# Patient Record
Sex: MALE | Race: BLACK OR AFRICAN AMERICAN | Employment: FULL TIME
[De-identification: names, ages, dates, MRNs, and addresses within clinical notes are randomized per-mention and may not be internally consistent; named-entity substitution may affect disease eponyms.]

---

## 2024-10-21 SDOH — HEALTH STABILITY: PHYSICAL HEALTH: ON AVERAGE, HOW MANY DAYS PER WEEK DO YOU ENGAGE IN MODERATE TO STRENUOUS EXERCISE (LIKE A BRISK WALK)?: 0 DAYS

## 2024-10-21 SDOH — HEALTH STABILITY: PHYSICAL HEALTH: ON AVERAGE, HOW MANY MINUTES DO YOU ENGAGE IN EXERCISE AT THIS LEVEL?: 0 MIN

## 2024-10-23 ENCOUNTER — OFFICE VISIT (OUTPATIENT)
Facility: CLINIC | Age: 29
End: 2024-10-23
Payer: COMMERCIAL

## 2024-10-23 VITALS
OXYGEN SATURATION: 96 % | WEIGHT: 114 LBS | BODY MASS INDEX: 18.32 KG/M2 | TEMPERATURE: 98 F | DIASTOLIC BLOOD PRESSURE: 70 MMHG | SYSTOLIC BLOOD PRESSURE: 126 MMHG | HEART RATE: 84 BPM | RESPIRATION RATE: 18 BRPM | HEIGHT: 66 IN

## 2024-10-23 DIAGNOSIS — Z76.89 ENCOUNTER TO ESTABLISH CARE: Primary | ICD-10-CM

## 2024-10-23 DIAGNOSIS — G89.29 CHRONIC LOW BACK PAIN WITHOUT SCIATICA, UNSPECIFIED BACK PAIN LATERALITY: ICD-10-CM

## 2024-10-23 DIAGNOSIS — Z85.048 HISTORY OF RECTAL CANCER: ICD-10-CM

## 2024-10-23 DIAGNOSIS — R07.89 CHEST WALL PAIN: ICD-10-CM

## 2024-10-23 DIAGNOSIS — D13.91 FAMILIAL POLYPOSIS: ICD-10-CM

## 2024-10-23 DIAGNOSIS — M54.50 CHRONIC LOW BACK PAIN WITHOUT SCIATICA, UNSPECIFIED BACK PAIN LATERALITY: ICD-10-CM

## 2024-10-23 DIAGNOSIS — R94.31 ABNORMAL ECG: ICD-10-CM

## 2024-10-23 PROBLEM — Z86.0100 HISTORY OF COLONIC POLYPS: Status: RESOLVED | Noted: 2023-09-20 | Resolved: 2024-10-23

## 2024-10-23 PROBLEM — Z85.038 HISTORY OF MALIGNANT NEOPLASM OF COLON: Status: RESOLVED | Noted: 2023-09-20 | Resolved: 2024-10-23

## 2024-10-23 PROBLEM — K92.0 HEMATEMESIS WITHOUT NAUSEA: Status: RESOLVED | Noted: 2022-03-25 | Resolved: 2024-10-23

## 2024-10-23 PROBLEM — C18.9 MALIGNANT NEOPLASM OF COLON (HCC): Status: RESOLVED | Noted: 2022-03-25 | Resolved: 2024-10-23

## 2024-10-23 PROBLEM — C18.2 CANCER OF RIGHT COLON (HCC): Status: RESOLVED | Noted: 2019-07-02 | Resolved: 2024-10-23

## 2024-10-23 PROBLEM — R63.4 WEIGHT LOSS: Status: ACTIVE | Noted: 2022-03-25

## 2024-10-23 PROBLEM — K62.5 GASTROINTESTINAL HEMORRHAGE ASSOCIATED WITH ANORECTAL SOURCE: Status: RESOLVED | Noted: 2019-05-15 | Resolved: 2024-10-23

## 2024-10-23 PROBLEM — K63.5 POLYPOSIS OF COLON: Status: RESOLVED | Noted: 2019-05-15 | Resolved: 2024-10-23

## 2024-10-23 PROBLEM — K62.89 RECTAL MASS: Status: RESOLVED | Noted: 2019-05-10 | Resolved: 2024-10-23

## 2024-10-23 PROBLEM — D64.9 ANEMIA: Status: ACTIVE | Noted: 2019-05-15

## 2024-10-23 PROCEDURE — 99204 OFFICE O/P NEW MOD 45 MIN: CPT | Performed by: FAMILY MEDICINE

## 2024-10-23 RX ORDER — NAPROXEN 500 MG/1
500 TABLET ORAL 2 TIMES DAILY WITH MEALS
Qty: 60 TABLET | Refills: 0 | Status: SHIPPED | OUTPATIENT
Start: 2024-10-23

## 2024-10-23 RX ORDER — CETIRIZINE HYDROCHLORIDE 10 MG/1
10 TABLET ORAL PRN
COMMUNITY

## 2024-10-23 RX ORDER — METHOCARBAMOL 750 MG/1
TABLET, FILM COATED ORAL
COMMUNITY
Start: 2024-10-15 | End: 2024-10-23 | Stop reason: SDUPTHER

## 2024-10-23 RX ORDER — NAPROXEN 500 MG/1
TABLET ORAL
COMMUNITY
Start: 2024-10-15 | End: 2024-10-23 | Stop reason: SDUPTHER

## 2024-10-23 RX ORDER — METHOCARBAMOL 750 MG/1
750 TABLET, FILM COATED ORAL 3 TIMES DAILY
Qty: 90 TABLET | Refills: 0 | Status: SHIPPED | OUTPATIENT
Start: 2024-10-23

## 2024-10-23 SDOH — ECONOMIC STABILITY: INCOME INSECURITY: HOW HARD IS IT FOR YOU TO PAY FOR THE VERY BASICS LIKE FOOD, HOUSING, MEDICAL CARE, AND HEATING?: NOT HARD AT ALL

## 2024-10-23 SDOH — ECONOMIC STABILITY: FOOD INSECURITY: WITHIN THE PAST 12 MONTHS, YOU WORRIED THAT YOUR FOOD WOULD RUN OUT BEFORE YOU GOT MONEY TO BUY MORE.: NEVER TRUE

## 2024-10-23 SDOH — ECONOMIC STABILITY: FOOD INSECURITY: WITHIN THE PAST 12 MONTHS, THE FOOD YOU BOUGHT JUST DIDN'T LAST AND YOU DIDN'T HAVE MONEY TO GET MORE.: NEVER TRUE

## 2024-10-23 ASSESSMENT — PATIENT HEALTH QUESTIONNAIRE - PHQ9
SUM OF ALL RESPONSES TO PHQ QUESTIONS 1-9: 0
1. LITTLE INTEREST OR PLEASURE IN DOING THINGS: NOT AT ALL
2. FEELING DOWN, DEPRESSED OR HOPELESS: NOT AT ALL
SUM OF ALL RESPONSES TO PHQ QUESTIONS 1-9: 0
SUM OF ALL RESPONSES TO PHQ QUESTIONS 1-9: 0
SUM OF ALL RESPONSES TO PHQ9 QUESTIONS 1 & 2: 0
SUM OF ALL RESPONSES TO PHQ QUESTIONS 1-9: 0

## 2024-10-23 ASSESSMENT — ENCOUNTER SYMPTOMS
COUGH: 0
DIARRHEA: 0
EYE REDNESS: 0
SHORTNESS OF BREATH: 0
ABDOMINAL PAIN: 0
BACK PAIN: 1
CHEST TIGHTNESS: 0
WHEEZING: 0

## 2024-10-23 NOTE — PROGRESS NOTES
[facility]  Establish care visit   10/23/2024    Willie Melo (: 1995) is a 29 y.o. male, here to establish care.    Chief Complaint   Patient presents with    Establish Care    Asthma    Back Pain     8 years, needs referral to Ortho    Chest Pain     For weeks, needs referral to Cardiology         ASSESSMENT/ PLAN  1. Encounter to establish care  VS reviewed     BMI reviewed   Appropriate healthy lifestyle modifications discussed.  All questions answered.   F/u discussed.    2. Chronic low back pain without sciatica, unspecified back pain laterality  Uncontrolled chronic issue. Refilled meds for comfort. Needs new referral to ortho back ans pine from ED. Has failed HEP in the past but still continues to use the stretches.   - methocarbamol (ROBAXIN) 750 MG tablet; Take 1 tablet by mouth 3 times daily  Dispense: 90 tablet; Refill: 0  - naproxen (NAPROSYN) 500 MG tablet; Take 1 tablet by mouth 2 times daily (with meals)  Dispense: 60 tablet; Refill: 0  - [facility], [place] ([facility])    3. Chest wall pain  Intermittent, likely musculoskeletal and sometimes referred from back pain. Reproducible. Lasting for a few seconds.     4. Abnormal ECG  Questionable LVH, sinus ken. Given history of cancer, will order echo to rule out structural abnormality and then determine if cardiology referral is needed.   - Echo (TTE) complete (PRN contrast/bubble/strain/3D); Future    5. Familial polyposis  Reviewed chart and work up as well as pathology reports. Followed by [facility].     6. History of rectal cancer  Reviewed chart and work up as well as pathology reports. Followed by [facility].        I have spent 45 minutes on chart review, care coordination and patient counseling regarding disease state, lifestyle modifications and/or health maintenance screening.       Return in about 1 year (around 10/23/2025).    Future Appointments   Date Time 
09-Jun-2023

## 2024-11-01 ENCOUNTER — HOSPITAL ENCOUNTER (OUTPATIENT)
Facility: HOSPITAL | Age: 29
Discharge: HOME | End: 2024-11-03
Payer: COMMERCIAL

## 2024-11-01 VITALS
HEIGHT: 65 IN | HEART RATE: 59 BPM | SYSTOLIC BLOOD PRESSURE: 129 MMHG | BODY MASS INDEX: 18.99 KG/M2 | WEIGHT: 114 LBS | DIASTOLIC BLOOD PRESSURE: 83 MMHG

## 2024-11-01 DIAGNOSIS — R94.31 ABNORMAL ECG: ICD-10-CM

## 2024-11-01 LAB
ECHO AO ASC DIAM: 2.6 CM
ECHO AO ASCENDING AORTA INDEX: 1.67 CM/M2
ECHO AO ROOT DIAM: 3.2 CM
ECHO AO ROOT INDEX: 2.05 CM/M2
ECHO AV AREA PEAK VELOCITY: 3.1 CM2
ECHO AV AREA VTI: 3.1 CM2
ECHO AV AREA/BSA PEAK VELOCITY: 2 CM2/M2
ECHO AV AREA/BSA VTI: 2 CM2/M2
ECHO AV MEAN GRADIENT: 2 MMHG
ECHO AV MEAN VELOCITY: 0.7 M/S
ECHO AV PEAK GRADIENT: 4 MMHG
ECHO AV PEAK VELOCITY: 1 M/S
ECHO AV VELOCITY RATIO: 0.9
ECHO AV VTI: 21.1 CM
ECHO BSA: 1.54 M2
ECHO LA DIAMETER INDEX: 1.92 CM/M2
ECHO LA DIAMETER: 3 CM
ECHO LA TO AORTIC ROOT RATIO: 0.94
ECHO LA VOL A-L A2C: 42 ML (ref 18–58)
ECHO LA VOL A-L A4C: 38 ML (ref 18–58)
ECHO LA VOL BP: 40 ML (ref 18–58)
ECHO LA VOL MOD A2C: 42 ML (ref 18–58)
ECHO LA VOL MOD A4C: 34 ML (ref 18–58)
ECHO LA VOL/BSA BIPLANE: 26 ML/M2 (ref 16–34)
ECHO LA VOLUME AREA LENGTH: 42 ML
ECHO LA VOLUME INDEX A-L A2C: 27 ML/M2 (ref 16–34)
ECHO LA VOLUME INDEX A-L A4C: 24 ML/M2 (ref 16–34)
ECHO LA VOLUME INDEX AREA LENGTH: 27 ML/M2 (ref 16–34)
ECHO LA VOLUME INDEX MOD A2C: 27 ML/M2 (ref 16–34)
ECHO LA VOLUME INDEX MOD A4C: 22 ML/M2 (ref 16–34)
ECHO LV E' LATERAL VELOCITY: 16.4 CM/S
ECHO LV E' SEPTAL VELOCITY: 13.4 CM/S
ECHO LV EDV A2C: 82 ML
ECHO LV EDV A4C: 107 ML
ECHO LV EDV BP: 96 ML (ref 67–155)
ECHO LV EDV INDEX A4C: 69 ML/M2
ECHO LV EDV INDEX BP: 62 ML/M2
ECHO LV EDV NDEX A2C: 53 ML/M2
ECHO LV EJECTION FRACTION A2C: 51 %
ECHO LV EJECTION FRACTION A4C: 56 %
ECHO LV EJECTION FRACTION BIPLANE: 55 % (ref 55–100)
ECHO LV ESV A2C: 40 ML
ECHO LV ESV A4C: 47 ML
ECHO LV ESV BP: 43 ML (ref 22–58)
ECHO LV ESV INDEX A2C: 26 ML/M2
ECHO LV ESV INDEX A4C: 30 ML/M2
ECHO LV ESV INDEX BP: 28 ML/M2
ECHO LV FRACTIONAL SHORTENING: 28 % (ref 28–44)
ECHO LV INTERNAL DIMENSION DIASTOLE INDEX: 2.5 CM/M2
ECHO LV INTERNAL DIMENSION DIASTOLIC: 3.9 CM (ref 4.2–5.9)
ECHO LV INTERNAL DIMENSION SYSTOLIC INDEX: 1.79 CM/M2
ECHO LV INTERNAL DIMENSION SYSTOLIC: 2.8 CM
ECHO LV IVSD: 0.8 CM (ref 0.6–1)
ECHO LV MASS 2D: 97.4 G (ref 88–224)
ECHO LV MASS INDEX 2D: 62.4 G/M2 (ref 49–115)
ECHO LV POSTERIOR WALL DIASTOLIC: 0.9 CM (ref 0.6–1)
ECHO LV RELATIVE WALL THICKNESS RATIO: 0.46
ECHO LVOT AREA: 3.5 CM2
ECHO LVOT AV VTI INDEX: 0.86
ECHO LVOT DIAM: 2.1 CM
ECHO LVOT MEAN GRADIENT: 2 MMHG
ECHO LVOT PEAK GRADIENT: 3 MMHG
ECHO LVOT PEAK VELOCITY: 0.9 M/S
ECHO LVOT STROKE VOLUME INDEX: 40.4 ML/M2
ECHO LVOT SV: 63 ML
ECHO LVOT VTI: 18.2 CM
ECHO MV A VELOCITY: 0.44 M/S
ECHO MV AREA VTI: 3.5 CM2
ECHO MV E DECELERATION TIME (DT): 137.5 MS
ECHO MV E VELOCITY: 0.67 M/S
ECHO MV E/A RATIO: 1.52
ECHO MV E/E' LATERAL: 4.09
ECHO MV E/E' RATIO (AVERAGED): 4.54
ECHO MV E/E' SEPTAL: 5
ECHO MV LVOT VTI INDEX: 0.99
ECHO MV MAX VELOCITY: 0.7 M/S
ECHO MV MEAN GRADIENT: 1 MMHG
ECHO MV MEAN VELOCITY: 0.4 M/S
ECHO MV PEAK GRADIENT: 2 MMHG
ECHO MV VTI: 18 CM
ECHO PV MAX VELOCITY: 0.8 M/S
ECHO PV MEAN GRADIENT: 2 MMHG
ECHO PV MEAN VELOCITY: 0.6 M/S
ECHO PV PEAK GRADIENT: 3 MMHG
ECHO RA END SYSTOLIC VOLUME APICAL 4 CHAMBER INDEX BSA: 26 ML/M2
ECHO RA VOLUME: 40 ML
ECHO RV FREE WALL PEAK S': 14.9 CM/S
ECHO RV INTERNAL DIMENSION: 4 CM
ECHO RV TAPSE: 2.3 CM (ref 1.7–?)
ECHO RVOT MEAN GRADIENT: 1 MMHG
ECHO RVOT PEAK GRADIENT: 2 MMHG
ECHO RVOT PEAK VELOCITY: 0.6 M/S
ECHO RVOT VTI: 16.5 CM

## 2024-11-01 PROCEDURE — 93306 TTE W/DOPPLER COMPLETE: CPT

## 2024-11-15 ENCOUNTER — OFFICE VISIT (OUTPATIENT)
Facility: CLINIC | Age: 29
End: 2024-11-15
Payer: COMMERCIAL

## 2024-11-15 VITALS
SYSTOLIC BLOOD PRESSURE: 120 MMHG | OXYGEN SATURATION: 95 % | RESPIRATION RATE: 20 BRPM | WEIGHT: 117 LBS | BODY MASS INDEX: 19.49 KG/M2 | TEMPERATURE: 98.2 F | HEIGHT: 65 IN | HEART RATE: 78 BPM | DIASTOLIC BLOOD PRESSURE: 68 MMHG

## 2024-11-15 DIAGNOSIS — R19.7 DIARRHEA, UNSPECIFIED TYPE: ICD-10-CM

## 2024-11-15 DIAGNOSIS — J18.9 COMMUNITY ACQUIRED PNEUMONIA OF RIGHT UPPER LOBE OF LUNG: Primary | ICD-10-CM

## 2024-11-15 DIAGNOSIS — R05.1 ACUTE COUGH: ICD-10-CM

## 2024-11-15 PROCEDURE — 99214 OFFICE O/P EST MOD 30 MIN: CPT | Performed by: FAMILY MEDICINE

## 2024-11-15 RX ORDER — DOXYCYCLINE 100 MG/1
100 CAPSULE ORAL 2 TIMES DAILY
COMMUNITY
Start: 2024-11-14 | End: 2024-11-21

## 2024-11-15 ASSESSMENT — ENCOUNTER SYMPTOMS
SHORTNESS OF BREATH: 0
ABDOMINAL PAIN: 0
CHEST TIGHTNESS: 0
DIARRHEA: 0
WHEEZING: 0
EYE REDNESS: 0
COUGH: 1

## 2024-11-15 NOTE — PROGRESS NOTES
\"Have you been to the ER, urgent care clinic since your last visit?  Hospitalized since your last visit?\"    YES - When: approximately 1 days ago.  Where and Why: [facility] ED for Pneumonia .    “Have you seen or consulted any other health care providers outside our system since your last visit?”    NO           
atraumatic.      Right Ear: External ear normal.      Left Ear: External ear normal.      Nose: Rhinorrhea present. No congestion.      Mouth/Throat:      Mouth: Mucous membranes are moist.      Pharynx: Oropharynx is clear. No oropharyngeal exudate or posterior oropharyngeal erythema.   Eyes:      Conjunctiva/sclera: Conjunctivae normal.   Cardiovascular:      Rate and Rhythm: Normal rate and regular rhythm.      Pulses: Normal pulses.      Heart sounds: Normal heart sounds.   Pulmonary:      Effort: Pulmonary effort is normal. No respiratory distress.      Breath sounds: Normal breath sounds. No wheezing, rhonchi or rales.   Chest:      Chest wall: Tenderness present.   Abdominal:      General: Bowel sounds are normal.   Musculoskeletal:         General: No signs of injury. Normal range of motion.      Cervical back: Normal range of motion.   Skin:     General: Skin is warm.      Capillary Refill: Capillary refill takes less than 2 seconds.      Findings: No erythema.   Neurological:      General: No focal deficit present.      Mental Status: He is alert and oriented to person, place, and time. Mental status is at baseline.   Psychiatric:         Mood and Affect: Mood normal.         Behavior: Behavior normal.         Thought Content: Thought content normal.         Judgment: Judgment normal.         Immunization History   Administered Date(s) Administered    Adenovirus, Type 4 And 7, Live, Oral (Admin As 2 Tab) 01/27/2015    Anthrax (BioThrax) 04/21/2015    Hep A-Hep B, TWINRIX, (age 18y+), IM, 1mL 01/27/2015, 04/21/2015    Influenza, FLUMIST, (age 2-49 y), Quadv Live, INTRANASAL, 0.2m 01/27/2015    Meningococcal ACWY, MENACTRA (MenACWY-D), (age 9m-55y), IM, 0.5mL 01/27/2015    Poliovirus, IPOL, (age 6w+), SC/IM, 0.5mL 01/27/2015    TDaP, ADACEL (age 10y-64y), BOOSTRIX (age 10y+), IM, 0.5mL 01/27/2015    Typhoid Vi capsular polysaccharide (Typhim VI) 04/21/2015    Varicella, VARIVAX, (age 12m+), SC, 0.5mL

## 2024-12-02 SDOH — HEALTH STABILITY: PHYSICAL HEALTH: ON AVERAGE, HOW MANY MINUTES DO YOU ENGAGE IN EXERCISE AT THIS LEVEL?: 30 MIN

## 2024-12-02 SDOH — HEALTH STABILITY: PHYSICAL HEALTH: ON AVERAGE, HOW MANY DAYS PER WEEK DO YOU ENGAGE IN MODERATE TO STRENUOUS EXERCISE (LIKE A BRISK WALK)?: 3 DAYS

## 2024-12-03 ENCOUNTER — OFFICE VISIT (OUTPATIENT)
Age: 29
End: 2024-12-03
Payer: COMMERCIAL

## 2024-12-03 ENCOUNTER — TELEPHONE (OUTPATIENT)
Facility: CLINIC | Age: 29
End: 2024-12-03

## 2024-12-03 VITALS
DIASTOLIC BLOOD PRESSURE: 76 MMHG | SYSTOLIC BLOOD PRESSURE: 113 MMHG | HEART RATE: 85 BPM | TEMPERATURE: 97.8 F | WEIGHT: 116 LBS | HEIGHT: 65 IN | OXYGEN SATURATION: 96 % | BODY MASS INDEX: 19.33 KG/M2

## 2024-12-03 DIAGNOSIS — G89.29 CHRONIC LOW BACK PAIN WITHOUT SCIATICA, UNSPECIFIED BACK PAIN LATERALITY: ICD-10-CM

## 2024-12-03 DIAGNOSIS — M54.50 CHRONIC LOW BACK PAIN WITHOUT SCIATICA, UNSPECIFIED BACK PAIN LATERALITY: ICD-10-CM

## 2024-12-03 DIAGNOSIS — M79.18 CHRONIC PRIMARY MUSCULOSKELETAL PAIN: Primary | ICD-10-CM

## 2024-12-03 DIAGNOSIS — G89.29 CHRONIC PRIMARY MUSCULOSKELETAL PAIN: Primary | ICD-10-CM

## 2024-12-03 DIAGNOSIS — M54.6 PAIN IN THORACIC SPINE: ICD-10-CM

## 2024-12-03 DIAGNOSIS — M54.2 NECK PAIN: ICD-10-CM

## 2024-12-03 PROCEDURE — 72070 X-RAY EXAM THORAC SPINE 2VWS: CPT | Performed by: PHYSICAL MEDICINE & REHABILITATION

## 2024-12-03 PROCEDURE — 72040 X-RAY EXAM NECK SPINE 2-3 VW: CPT | Performed by: PHYSICAL MEDICINE & REHABILITATION

## 2024-12-03 PROCEDURE — 99204 OFFICE O/P NEW MOD 45 MIN: CPT | Performed by: PHYSICAL MEDICINE & REHABILITATION

## 2024-12-03 RX ORDER — NAPROXEN 500 MG/1
500 TABLET ORAL 2 TIMES DAILY PRN
Qty: 180 TABLET | Refills: 1 | Status: SHIPPED | OUTPATIENT
Start: 2024-12-03 | End: 2025-06-01

## 2024-12-03 ASSESSMENT — ENCOUNTER SYMPTOMS
SHORTNESS OF BREATH: 0
TROUBLE SWALLOWING: 0
BACK PAIN: 1
DIARRHEA: 0
VOMITING: 0
NAUSEA: 0

## 2024-12-03 NOTE — TELEPHONE ENCOUNTER
Patient is still experiencing a lot of cough and congestion. Is unsure if he should make another appt to come in.

## 2024-12-03 NOTE — PROGRESS NOTES
Willie Melo presents today for   Chief Complaint   Patient presents with    Neck Pain    Back Pain     Upper and mid         Is someone accompanying this pt? no    Is the patient using any DME equipment during OV? no    Coordination of Care:  1. Have you been to the ER, urgent care clinic since your last visit? Yes, pt went to ER in November for pneumonia  Hospitalized since your last visit? no    2. Have you seen or consulted any other health care providers outside of the [facility] since your last visit? Yes, pcp Include any pap smears or colon screening. no      
symptoms and treatment plan.  All questions were answered. More than half of this visit today was spent on counseling.     By signing my name below, I, KIERRA Fernandez, attest that this documentation has been prepared under the direction and in the presence of Morris Dutton MD  Electronically signed: KIERRA Fernandez. 12/3/24 10:03 AM EST      I, Morris Dutton MD, personally performed the services described in this documentation. I have authorized the scribe to complete the medical record entries input within this chart. I have reviewed the chart and agree that the record reflects my personal performance and is accurate and complete. [Electronically Signed: Morris Dutton MD. 12/3/2024 1:38 PM]

## 2024-12-04 DIAGNOSIS — M54.50 CHRONIC LOW BACK PAIN WITHOUT SCIATICA, UNSPECIFIED BACK PAIN LATERALITY: ICD-10-CM

## 2024-12-04 DIAGNOSIS — G89.29 CHRONIC LOW BACK PAIN WITHOUT SCIATICA, UNSPECIFIED BACK PAIN LATERALITY: ICD-10-CM

## 2024-12-04 RX ORDER — METHOCARBAMOL 750 MG/1
750 TABLET, FILM COATED ORAL 3 TIMES DAILY
Qty: 90 TABLET | Refills: 0 | OUTPATIENT
Start: 2024-12-04

## 2024-12-04 RX ORDER — METHOCARBAMOL 750 MG/1
750 TABLET, FILM COATED ORAL 3 TIMES DAILY
Qty: 90 TABLET | Refills: 0 | Status: SHIPPED | OUTPATIENT
Start: 2024-12-04

## 2024-12-04 NOTE — TELEPHONE ENCOUNTER
This patient contacted the office for the following prescriptions to be refilled:    Medication requested :   Requested Prescriptions     Pending Prescriptions Disp Refills    methocarbamol (ROBAXIN) 750 MG tablet [Pharmacy Med Name: METHOCARBAMOL 750 MG TABLET] 90 tablet 0     Sig: TAKE 1 TABLET BY MOUTH 3 TIMES A DAY        Last Refilled: 10/23/2024    Last Office Visit: 11/15/2024    Next Office Visit: 10/23/2025      
09-Apr-2019

## 2024-12-27 ENCOUNTER — HOSPITAL ENCOUNTER (OUTPATIENT)
Facility: HOSPITAL | Age: 29
Setting detail: RECURRING SERIES
Discharge: HOME | End: 2024-12-30
Payer: COMMERCIAL

## 2024-12-27 PROCEDURE — 97161 PT EVAL LOW COMPLEX 20 MIN: CPT

## 2024-12-27 NOTE — PROGRESS NOTES
PT DAILY TREATMENT NOTE/CERVICAL [id number]      Patient Name: Willie Melo    Date: 2024    : 1995  Insurance: Payor: [facility] / Plan: [facility] / Product Type: *No Product type* /      Patient  verified yes     Visit #   Current / Total 1 12   Time   In / Out 10:40 11:18   Pain   In / Out 4-5/10 4-5/10   Subjective Functional Status/Changes: See below     Treatment Area: No admission diagnoses are documented for this encounter.    If an interpreting service is utilized for treatment of this patient, the contents of this document represent the material reviewed with the patient via the .     SUBJECTIVE  Pain Level (0-10 scale): 4-5/10  []constant []intermittent []improving []worsening []no change since onset    Any medication changes, allergies to medications, adverse drug reactions, diagnosis change, or new procedure performed?: [x] No    [] Yes (see summary sheet for update)  Subjective functional status/changes:       Mechanism of Injury:  . Pt. Reports he was in [employer] doing [employer] work around that time. Reports pain has worsened over time. Reports pain with turning his head. Sleeping is difficulty because of pain. Headaches occasionally at back of head 2-3x a week. Increased pain with laying still and turning. Denies numbness/tingling or weakness into hands. Denies dizziness. [facility]   Work Hx: driving for work to [employer]   Pt Goals: to have less pain     OBJECTIVE/EXAMINATION      33 min [x]Eval - untimed                      Therapeutic Procedures:  Tx Min Billable or 1:1 Min (if diff from Tx Min) Procedure, Rationale, Specifics   5  10957 Therapeutic Exercise (timed):  increase ROM, strength, coordination, balance, and proprioception to improve patient's ability to progress to PLOF and address remaining functional goals. (see flow sheet as applicable)     Details if applicable:  HEP          Details if applicable:

## 2024-12-27 NOTE — THERAPY EVALUATION
[facility]  [address] [place] [address] Ph:[phone] Fx: [phone]  Plan of Care / Statement of Necessity for Physical Therapy Services     Patient Name: Willie Melo : 1995   Medical   Diagnosis: M54.2  NECK PAIN and M54.6  THORACIC PAIN Treatment Diagnosis: M54.2  NECK PAIN and M54.6  THORACIC PAIN      Onset Date:  Payor :  Payor: [facility] / Plan: [facility] / Product Type: *No Product type* /    Referral Source: Morris Dutton MD Start of Care (SOC): 2024   Prior Hospitalization: See medical history Provider #: [id number]   Prior Level of Function: Ind with ADLs, driving for work   Comorbidities:  Respiratory disorders and Musculoskeletal disorders     Assessment / key information:    Pt. Is a 29 year old male c/o neck and back pain that began in  and has worsened over time. He reports having increased pain with turning his head. He also reports having difficulty getting to sleep. He does reports having headaches 2-3x a week and denies numbness/tingling into UE. Pt. Presents with decreased cervical AROM in all directions rotation right: 31 degrees left: 23 degrees. He also has decreased shoulder abduction AROM right: 110 degrees left: 103 degrees. No myotome invovlement was noted but has has decreased B horizontal abduction strength at 4-/5. Deep cervical flexion endurance was 7 seconds. He has cervical and thoracic hypomobility. He also has significant muscle tightness and tenderness with palpation over thoracic and cervical paraspinals. Skilled PT is medically necessary in order to improve cervical and thoracic mobility and strength for increased ease of ADLs and improved quality of life.     Evaluation Complexity:  History:  MEDIUM  Complexity : 1-2 comorbidities / personal factors will impact the outcome/ POC ; Examination:  MEDIUM Complexity : 3 Standardized tests and measures

## 2025-01-15 ENCOUNTER — HOSPITAL ENCOUNTER (OUTPATIENT)
Facility: HOSPITAL | Age: 30
Setting detail: RECURRING SERIES
Discharge: HOME | End: 2025-01-18
Payer: COMMERCIAL

## 2025-01-15 PROCEDURE — 97112 NEUROMUSCULAR REEDUCATION: CPT

## 2025-01-15 PROCEDURE — 97535 SELF CARE MNGMENT TRAINING: CPT

## 2025-01-15 PROCEDURE — 97110 THERAPEUTIC EXERCISES: CPT

## 2025-01-15 PROCEDURE — 97530 THERAPEUTIC ACTIVITIES: CPT

## 2025-01-15 NOTE — PROGRESS NOTES
PHYSICAL / OCCUPATIONAL THERAPY - DAILY TREATMENT NOTE    Patient Name: Willie Melo    Date: 1/15/2025    : 1995  Insurance: Payor: [facility] [facility] / Plan: [employer] [facility] / Product Type: *No Product type* /      Patient  verified Yes     Visit #   Current / Total 2 12   Time   In / Out 4:00 4:48   Pain   In / Out 0/10 3/10   Subjective Functional Status/Changes: Pt reports he took his meds like an hour ago. He is doing HEP 4x weekly. Things are about the same.      TREATMENT AREA =  Neck pain [M54.2]  Thoracic back pain [M54.6]     OBJECTIVE    Modalities Rationale:     decrease pain and increase tissue extensibility to improve patient's ability to progress to PLOF and address remaining functional goals.     min [] Estim Unattended, type/location:                                      []  w/ice    []  w/heat    min [] Estim Attended, type/location:                                     []  w/US     []  w/ice    []  w/heat    []  TENS insruct      min []  Mechanical Traction: type/lbs                   []  pro   []  sup   []  int   []  cont    []  before manual    []  after manual    min []  Ultrasound, settings/location:     10 min  unbill []  Ice     [x]  Heat    location/position: Supine with wedge - c/s and UTs    min []  Paraffin,  details:     min []  Vasopneumatic Device, press/temp:     min []  Whirlpool / Fluido:    If using vaso (only need to measure limb vaso being performed on)      pre-treatment girth :       post-treatment girth :       measured at (landmark location) :      min []  Other:    Skin assessment post-treatment:   Intact      Therapeutic Procedures:    Tx Min Billable or 1:1 Min (if diff from Tx Min) Procedure, Rationale, Specifics   12  [id number] Therapeutic Exercise (timed):  increase ROM, strength, coordination, balance, and proprioception to improve patient's ability to progress to PLOF and address remaining functional goals. (see flow sheet as applicable)

## 2025-01-17 ENCOUNTER — HOSPITAL ENCOUNTER (OUTPATIENT)
Facility: HOSPITAL | Age: 30
Setting detail: RECURRING SERIES
Discharge: HOME | End: 2025-01-20
Payer: COMMERCIAL

## 2025-01-17 PROCEDURE — 97530 THERAPEUTIC ACTIVITIES: CPT

## 2025-01-17 PROCEDURE — 97112 NEUROMUSCULAR REEDUCATION: CPT

## 2025-01-17 PROCEDURE — 97110 THERAPEUTIC EXERCISES: CPT

## 2025-01-17 NOTE — PROGRESS NOTES
PHYSICAL / OCCUPATIONAL THERAPY - DAILY TREATMENT NOTE    Patient Name: Willie Melo    Date: 2025    : 1995  Insurance: Payor: [facility] / Plan: [facility] [facility] / Product Type: *No Product type* /      Patient  verified Yes     Visit #   Current / Total 3 12   Time   In / Out 10:00 10:55   Pain   In / Out 3/10 suboccipitals  and right c/s 1/10   Subjective Functional Status/Changes: Pt reports pain at base of hema and into right side of neck. Last night his back hurt and he did not sleep well.     He felt some relief after last session for 4-5 hours then the pain came back.      TREATMENT AREA =  Neck pain [M54.2]  Thoracic back pain [M54.6]     OBJECTIVE    Modalities Rationale:     decrease pain and increase tissue extensibility to improve patient's ability to progress to PLOF and address remaining functional goals.     min [] Estim Unattended, type/location:                                      []  w/ice    []  w/heat    min [] Estim Attended, type/location:                                     []  w/US     []  w/ice    []  w/heat    []  TENS insruct      min []  Mechanical Traction: type/lbs                   []  pro   []  sup   []  int   []  cont    []  before manual    []  after manual    min []  Ultrasound, settings/location:     10 min  unbill []  Ice     [x]  Heat    location/position: Supine with wedge - c/s and UTs    min []  Paraffin,  details:     min []  Vasopneumatic Device, press/temp:     min []  Whirlpool / Fluido:    If using vaso (only need to measure limb vaso being performed on)      pre-treatment girth :       post-treatment girth :       measured at (landmark location) :      min []  Other:    Skin assessment post-treatment:   Intact      Therapeutic Procedures:    Tx Min Billable or 1:1 Min (if diff from Tx Min) Procedure, Rationale, Specifics   15 [phone] [id number] Therapeutic Exercise (timed):  increase ROM, strength, coordination, balance, and

## 2025-01-20 ENCOUNTER — HOSPITAL ENCOUNTER (OUTPATIENT)
Facility: HOSPITAL | Age: 30
Setting detail: RECURRING SERIES
Discharge: HOME | End: 2025-01-23
Payer: COMMERCIAL

## 2025-01-20 PROCEDURE — 97112 NEUROMUSCULAR REEDUCATION: CPT

## 2025-01-20 PROCEDURE — 97530 THERAPEUTIC ACTIVITIES: CPT

## 2025-01-20 PROCEDURE — 97110 THERAPEUTIC EXERCISES: CPT

## 2025-01-20 NOTE — PROGRESS NOTES
PHYSICAL / OCCUPATIONAL THERAPY - DAILY TREATMENT NOTE    Patient Name: Willie Melo    Date: 2025    : 1995  Insurance: Payor: [facility] / Plan: [facility] [facility] / Product Type: *No Product type* /      Patient  verified Yes     Visit #   Current / Total 4 12   Time   In / Out 9:15 10:08   Pain   In / Out 10 upper t/s 10   Subjective Functional Status/Changes: Pt reports his neck felt better after last session, he had some pain in his back but it went away after a while. His pain today is upper back. He took his pain medicine this morning but does not think it has kicked all the way in yet.      TREATMENT AREA =  Neck pain [M54.2]  Thoracic back pain [M54.6]     OBJECTIVE    Modalities Rationale:     decrease pain and increase tissue extensibility to improve patient's ability to progress to PLOF and address remaining functional goals.     min [] Estim Unattended, type/location:                                      []  w/ice    []  w/heat    min [] Estim Attended, type/location:                                     []  w/US     []  w/ice    []  w/heat    []  TENS insruct      min []  Mechanical Traction: type/lbs                   []  pro   []  sup   []  int   []  cont    []  before manual    []  after manual    min []  Ultrasound, settings/location:     10 min  unbill []  Ice     [x]  Heat    location/position: Supine with wedge - t/s    min []  Paraffin,  details:     min []  Vasopneumatic Device, press/temp:     min []  Whirlpool / Fluido:    If using vaso (only need to measure limb vaso being performed on)      pre-treatment girth :       post-treatment girth :       measured at (landmark location) :      min []  Other:    Skin assessment post-treatment:   Intact      Therapeutic Procedures:    Tx Min Billable or 1:1 Min (if diff from Tx Min) Procedure, Rationale, Specifics   15  [id number] Therapeutic Exercise (timed):  increase ROM, strength, coordination, balance, and

## 2025-01-24 ENCOUNTER — HOSPITAL ENCOUNTER (OUTPATIENT)
Facility: HOSPITAL | Age: 30
Setting detail: RECURRING SERIES
Discharge: HOME | End: 2025-01-27
Payer: COMMERCIAL

## 2025-01-24 PROCEDURE — 97530 THERAPEUTIC ACTIVITIES: CPT

## 2025-01-24 PROCEDURE — 97112 NEUROMUSCULAR REEDUCATION: CPT

## 2025-01-24 PROCEDURE — 97110 THERAPEUTIC EXERCISES: CPT

## 2025-01-24 NOTE — PROGRESS NOTES
PHYSICAL / OCCUPATIONAL THERAPY - DAILY TREATMENT NOTE    Patient Name: Willie Melo    Date: 2025    : 1995  Insurance: Payor: [facility] / Plan: [facility] [facility] / Product Type: *No Product type* /      Patient  verified Yes     Visit #   Current / Total 5 12   Time   In / Out 11:20 12:15   Pain   In / Out 3/10 c/s 2/10 c/s   Subjective Functional Status/Changes: Pt reports his neck is bothering him again today.      TREATMENT AREA =  Neck pain [M54.2]  Thoracic back pain [M54.6]     OBJECTIVE    Modalities Rationale:     decrease pain and increase tissue extensibility to improve patient's ability to progress to PLOF and address remaining functional goals.     min [] Estim Unattended, type/location:                                      []  w/ice    []  w/heat    min [] Estim Attended, type/location:                                     []  w/US     []  w/ice    []  w/heat    []  TENS insruct      min []  Mechanical Traction: type/lbs                   []  pro   []  sup   []  int   []  cont    []  before manual    []  after manual    min []  Ultrasound, settings/location:     10 min  unbill []  Ice     [x]  Heat    location/position: Supine with wedge - t/s    min []  Paraffin,  details:     min []  Vasopneumatic Device, press/temp:     min []  Whirlpool / Fluido:    If using vaso (only need to measure limb vaso being performed on)      pre-treatment girth :       post-treatment girth :       measured at (landmark location) :      min []  Other:    Skin assessment post-treatment:   Intact      Therapeutic Procedures:    Tx Min Billable or 1:1 Min (if diff from Tx Min) Procedure, Rationale, Specifics    77575 Therapeutic Exercise (timed):  increase ROM, strength, coordination, balance, and proprioception to improve patient's ability to progress to PLOF and address remaining functional goals. (see flow sheet as applicable)     Details if applicable:       15 15 [id number]

## 2025-01-24 NOTE — THERAPY RECERTIFICATION
[facility]  [address] [place] [address] - Ph: [phone]   Fx: [phone]  PHYSICAL THERAPY PROGRESS NOTE  [x] Progress Note  [] Discharge Summary    Patient Name: Willie Melo : 1995   Treatment/Medical Diagnosis: Neck pain [M54.2]  Thoracic back pain [M54.6]   Referral Source: Morris Dutton MD     Date of Initial Visit: 24 Attended Visits: 5 Missed Visits: 0       Comorbidities: Respiratory disorders and Musculoskeletal disorders   Prior Level of Function:Ind with ADLs, driving for work     SUMMARY OF TREATMENT  Patient has been seen for 5 visits since initial evaluation on 24 for neck and thoracic pain. Therapy has consisted of therapeutic exercise, therapeutic activity, neuromuscular re-education, manual therapy, moist heat modalities, patient education and HEP.  Pt is making slow progress in therapy with pain fluctuating between c/s and t/s each session. Continues to demonstrate decreased t/s mobility addressed with PA mobs and cervical rotation ROM addressed with MET and KT taping. Improved shoulder abduction bilaterally, however continues to be more limited with left shoulder than right shoulder. Overall pt feels his thoracic pain has improved 65% but has not noted improvement with cervical pain which is affecting his ability to fall asleep at night. 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     HEP Compliance: 2x weekly  % Improvement: t/s: 65%, c/s: 0%  Pain range: t/s: 0-3/10, c/s: 2-8/10  Subjective Improvement: decreasing t/s pain, improving t/s mobility  Continued Deficits: continue to improve t/s, c/s pain, decreased c/s mobility, difficulty driving, sleeping tolerance     Patient will continue to benefit from skilled PT / OT services to modify and progress therapeutic interventions, analyze and address functional mobility deficits, analyze and address ROM deficits,

## 2025-01-27 ENCOUNTER — HOSPITAL ENCOUNTER (OUTPATIENT)
Facility: HOSPITAL | Age: 30
Setting detail: RECURRING SERIES
End: 2025-01-27
Payer: COMMERCIAL

## 2025-01-31 ENCOUNTER — HOSPITAL ENCOUNTER (OUTPATIENT)
Facility: HOSPITAL | Age: 30
Setting detail: RECURRING SERIES
End: 2025-01-31
Payer: COMMERCIAL

## 2025-01-31 PROCEDURE — 97110 THERAPEUTIC EXERCISES: CPT

## 2025-01-31 PROCEDURE — 97530 THERAPEUTIC ACTIVITIES: CPT

## 2025-01-31 PROCEDURE — 97112 NEUROMUSCULAR REEDUCATION: CPT

## 2025-01-31 NOTE — PROGRESS NOTES
PHYSICAL / OCCUPATIONAL THERAPY - DAILY TREATMENT NOTE    Patient Name: Willie Melo    Date: 2025    : 1995  Insurance: Payor: [employer] [facility] [facility] / Plan: [employer] [facility] [facility] / Product Type: *No Product type* /      Patient  verified Yes     Visit #   Current / Total 1 8   Time   In / Out 9:22 10:16   Pain   In / Out 3/10 c/s with meds 2/10 right c/s   Subjective Functional Status/Changes: Pt reports his pain was a 7/10 last night and he took meds, he was finally able to fall asleep for 3 hrs before he came in today.      TREATMENT AREA =  Neck pain [M54.2]  Thoracic back pain [M54.6]     OBJECTIVE    Modalities Rationale:     decrease pain and increase tissue extensibility to improve patient's ability to progress to PLOF and address remaining functional goals.     min [] Estim Unattended, type/location:                                      []  w/ice    []  w/heat    min [] Estim Attended, type/location:                                     []  w/US     []  w/ice    []  w/heat    []  TENS insruct      min []  Mechanical Traction: type/lbs                   []  pro   []  sup   []  int   []  cont    []  before manual    []  after manual    min []  Ultrasound, settings/location:     10 min  unbill []  Ice     [x]  Heat    location/position: Supine with wedge - c/s and UTs    min []  Paraffin,  details:     min []  Vasopneumatic Device, press/temp:     min []  Whirlpool / Fluido:    If using vaso (only need to measure limb vaso being performed on)      pre-treatment girth :       post-treatment girth :       measured at (landmark location) :      min []  Other:    Skin assessment post-treatment:   Intact      Therapeutic Procedures:    Tx Min Billable or 1:1 Min (if diff from Tx Min) Procedure, Rationale, Specifics   10  58884 Therapeutic Exercise (timed):  increase ROM, strength, coordination, balance, and proprioception to improve patient's ability to progress to PLOF and address remaining  address ROM deficits, analyze and address strength deficits, analyze and address soft tissue restrictions, analyze and cue for proper movement patterns, analyze and modify for postural abnormalities, analyze and address imbalance/dizziness, and instruct in home and community integration to address functional deficits and attain remaining goals.    Progress toward goals / Updated goals:  []  See Progress Note/Recertification  Goal: Patient will improve Neck Disability Index by 15% in order to demonstrate a significant improvement in pain for increased ease of daily activities.    Status at last progress note: Progressin%  Current: reports increased pain last night, had to take pain meds (25)     2.   Goal: Patient will improve B cervical rotation AROM to 50 degrees in order to increase ease of driving.   Status at last progress note: Not Met: right: 45 degrees left: 15 degrees with p! post-MET     3.   Goal: Patient will improve B shoulder abduction AROM to 150 degrees in order to increase ease of household chores.   Status at last progress note: Progressing: right: 160 degrees left: 125 degrees      4.   Goal: Patient will improve B horizontal abduction strength to 4/5 in order to increase ease of ADLs  Status at last progress note: Progressing: Right: 4/5, Left 4-/5     5.   Goal: Patient will improve deep cervical flexion endurance to 15 seconds in order to increase ease of working.   Status at last progress note: Progressin sec with p!    Next PN/ RC due 25  Auth due (visit number/ date) 15v exp 24-25    PLAN  - Continue Plan of Care    Caren Menendez PTA    2025    7:47 AM  If an interpreting service was utilized for treatment of this patient, the contents of this document represent the material reviewed with the patient via the .     Future Appointments   Date Time Provider Department Center   2025  9:20 AM Caren Menendez PTA [id number]   3/5/2025

## 2025-02-05 ENCOUNTER — HOSPITAL ENCOUNTER (OUTPATIENT)
Facility: HOSPITAL | Age: 30
Setting detail: RECURRING SERIES
Discharge: HOME | End: 2025-02-08
Payer: COMMERCIAL

## 2025-02-05 PROCEDURE — 97110 THERAPEUTIC EXERCISES: CPT

## 2025-02-05 PROCEDURE — 97535 SELF CARE MNGMENT TRAINING: CPT

## 2025-02-05 PROCEDURE — 97530 THERAPEUTIC ACTIVITIES: CPT

## 2025-02-05 PROCEDURE — 97112 NEUROMUSCULAR REEDUCATION: CPT

## 2025-02-05 NOTE — PROGRESS NOTES
posture and c/o pain in c/s and t/s. Increased pain and difficulty with right c/s rotation today compared to usual difficulty with left rotation. Pain with most therex today. Fair tolerance to manual techniques. Heavy posture education and handouts/HEP provided. Plan to add pec S NV and evaluate use of lumbar roll NV.     Patient will continue to benefit from skilled PT / OT services to modify and progress therapeutic interventions, analyze and address functional mobility deficits, analyze and address ROM deficits, analyze and address strength deficits, analyze and address soft tissue restrictions, analyze and cue for proper movement patterns, analyze and modify for postural abnormalities, analyze and address imbalance/dizziness, and instruct in home and community integration to address functional deficits and attain remaining goals.    Progress toward goals / Updated goals:  []  See Progress Note/Recertification  Goal: Patient will improve Neck Disability Index by 15% in order to demonstrate a significant improvement in pain for increased ease of daily activities.    Status at last progress note: Progressin%  Current: reports increased pain last night, had to take pain meds (25)     2.   Goal: Patient will improve B cervical rotation AROM to 50 degrees in order to increase ease of driving.   Status at last progress note: Not Met: right: 45 degrees left: 15 degrees with p! post-MET     3.   Goal: Patient will improve B shoulder abduction AROM to 150 degrees in order to increase ease of household chores.   Status at last progress note: Progressing: right: 160 degrees left: 125 degrees      4.   Goal: Patient will improve B horizontal abduction strength to 4/5 in order to increase ease of ADLs  Status at last progress note: Progressing: Right: 4/5, Left 4-/5     5.   Goal: Patient will improve deep cervical flexion endurance to 15 seconds in order to increase ease of working.   Status at last progress note:

## 2025-02-11 ENCOUNTER — HOSPITAL ENCOUNTER (OUTPATIENT)
Facility: HOSPITAL | Age: 30
Setting detail: RECURRING SERIES
Discharge: HOME | End: 2025-02-14
Payer: COMMERCIAL

## 2025-02-11 PROCEDURE — 97530 THERAPEUTIC ACTIVITIES: CPT

## 2025-02-11 PROCEDURE — 97110 THERAPEUTIC EXERCISES: CPT

## 2025-02-11 PROCEDURE — 97112 NEUROMUSCULAR REEDUCATION: CPT

## 2025-02-11 NOTE — PROGRESS NOTES
PHYSICAL / OCCUPATIONAL THERAPY - DAILY TREATMENT NOTE    Patient Name: Willie Melo    Date: 2025    : 1995  Insurance: Payor: [employer] [facility] [facility] / Plan: [employer] [facility] [facility] / Product Type: *No Product type* /      Patient  verified Yes     Visit #   Current / Total 3 8   Time   In / Out 2:04 2:50   Pain   In / Out 2/10 with meds 1/10   Subjective Functional Status/Changes: Pt reports 2/10 pain with meds.      TREATMENT AREA =  Neck pain [M54.2]  Thoracic back pain [M54.6]     OBJECTIVE    Modalities Rationale:     decrease pain and increase tissue extensibility to improve patient's ability to progress to PLOF and address remaining functional goals.     min [] Estim Unattended, type/location:                                      []  w/ice    []  w/heat    min [] Estim Attended, type/location:                                     []  w/US     []  w/ice    []  w/heat    []  TENS insruct      min []  Mechanical Traction: type/lbs                   []  pro   []  sup   []  int   []  cont    []  before manual    []  after manual    min []  Ultrasound, settings/location:     10 min  unbill []  Ice     [x]  Heat    location/position: Supine with wedge - c/s and UTs    min []  Paraffin,  details:     min []  Vasopneumatic Device, press/temp:     min []  Whirlpool / Fluido:    If using vaso (only need to measure limb vaso being performed on)      pre-treatment girth :       post-treatment girth :       measured at (landmark location) :      min []  Other:    Skin assessment post-treatment:   Intact      Therapeutic Procedures:    Tx Min Billable or 1:1 Min (if diff from Tx Min) Procedure, Rationale, Specifics   12  20154 Therapeutic Exercise (timed):  increase ROM, strength, coordination, balance, and proprioception to improve patient's ability to progress to PLOF and address remaining functional goals. (see flow sheet as applicable)     Details if applicable:       10 [id number] Therapeutic Activity

## 2025-02-14 ENCOUNTER — APPOINTMENT (OUTPATIENT)
Facility: HOSPITAL | Age: 30
End: 2025-02-14
Payer: COMMERCIAL

## 2025-02-17 ENCOUNTER — HOSPITAL ENCOUNTER (OUTPATIENT)
Facility: HOSPITAL | Age: 30
Setting detail: RECURRING SERIES
End: 2025-02-17
Payer: COMMERCIAL

## 2025-02-21 ENCOUNTER — TELEPHONE (OUTPATIENT)
Facility: HOSPITAL | Age: 30
End: 2025-02-21

## 2025-02-24 ENCOUNTER — HOSPITAL ENCOUNTER (OUTPATIENT)
Facility: HOSPITAL | Age: 30
Setting detail: RECURRING SERIES
Discharge: HOME | End: 2025-02-27
Payer: COMMERCIAL

## 2025-02-24 PROCEDURE — 97112 NEUROMUSCULAR REEDUCATION: CPT

## 2025-02-24 PROCEDURE — 97110 THERAPEUTIC EXERCISES: CPT

## 2025-02-24 PROCEDURE — 97530 THERAPEUTIC ACTIVITIES: CPT

## 2025-02-24 NOTE — THERAPY RECERTIFICATION
[facility]  [address] [place] [address] - Ph: [phone]   Fx: [phone]  PHYSICAL THERAPY PROGRESS NOTE  [x] Progress Note  [] Discharge Summary    Patient Name: Willie Melo : 1995   Treatment/Medical Diagnosis: Neck pain [M54.2]  Thoracic back pain [M54.6]   Referral Source: Morris Dutton MD     Date of Initial Visit: 24 Attended Visits: 9 Missed Visits: 3+       Comorbidities: Respiratory disorders and Musculoskeletal disorders   Prior Level of Function:Ind with ADLs, driving for work     SUMMARY OF TREATMENT  Pt has been seen for 4 additional session since last PN on 25. He reports decreased compliance with HEP due to acquiring the flu. Prior to getting sick he was making fair progress towards his goals however presents back to therapy after 2 week gap with decreased measurements today. Overall he reports 50% progress with thoracic spine pain and mobility but has not had significant change with his neck pain or mobility.     HEP Compliance: decreased compliance d/t being sick  % Improvement: t/s: 50%; c/s 0%  Pain range: t/s: 0-4/10; c/s: 3-7/10 (lowest with meds)  Subjective Improvement: no change with neck - relief is short term, improved mobility and decreased pain with t/s, no back spasms  Continued Deficits: to keep working on the neck pain, posture    Patient will continue to benefit from skilled PT / OT services to modify and progress therapeutic interventions, analyze and address functional mobility deficits, analyze and address ROM deficits, analyze and address strength deficits, analyze and address soft tissue restrictions, analyze and cue for proper movement patterns, analyze and modify for postural abnormalities, analyze and address imbalance/dizziness, and instruct in home and community integration to address functional deficits and attain remaining goals.       CURRENT STATUS/Progress towards

## 2025-02-24 NOTE — PROGRESS NOTES
PHYSICAL / OCCUPATIONAL THERAPY - DAILY TREATMENT NOTE    Patient Name: Willie Melo    Date: 2025    : 1995  Insurance: Payor: [employer] [facility] / Plan: [employer] [facility] [facility] / Product Type: *No Product type* /      Patient  verified Yes     Visit #   Current / Total 4 8   Time   In / Out 2:04 2:52   Pain   In / Out 3/10 c/s 3/10   Subjective Functional Status/Changes: Pt reports 50% progress with t/s but no change in c/s. His neck for feel better right after sessions but that is it. He has not done his HEP because he had the flu.      TREATMENT AREA =  Neck pain [M54.2]  Thoracic back pain [M54.6]     OBJECTIVE    Modalities Rationale:     decrease pain and increase tissue extensibility to improve patient's ability to progress to PLOF and address remaining functional goals.     min [] Estim Unattended, type/location:                                      []  w/ice    []  w/heat    min [] Estim Attended, type/location:                                     []  w/US     []  w/ice    []  w/heat    []  TENS insruct      min []  Mechanical Traction: type/lbs                   []  pro   []  sup   []  int   []  cont    []  before manual    []  after manual    min []  Ultrasound, settings/location:     10 min  unbill []  Ice     [x]  Heat    location/position: Supine with wedge - c/s and UTs    min []  Paraffin,  details:     min []  Vasopneumatic Device, press/temp:     min []  Whirlpool / Fluido:    If using vaso (only need to measure limb vaso being performed on)      pre-treatment girth :       post-treatment girth :       measured at (landmark location) :      min []  Other:    Skin assessment post-treatment:   Intact      Therapeutic Procedures:    Tx Min Billable or 1:1 Min (if diff from Tx Min) Procedure, Rationale, Specifics   10  06223 Therapeutic Exercise (timed):  increase ROM, strength, coordination, balance, and proprioception to improve patient's ability to progress to PLOF and

## 2025-02-28 ENCOUNTER — HOSPITAL ENCOUNTER (OUTPATIENT)
Facility: HOSPITAL | Age: 30
Setting detail: RECURRING SERIES
End: 2025-02-28
Payer: COMMERCIAL

## 2025-03-04 NOTE — PROGRESS NOTES
all   Food Insecurity: No Food Insecurity (10/23/2024)    Hunger Vital Sign     Worried About Running Out of Food in the Last Year: Never true     Ran Out of Food in the Last Year: Never true   Transportation Needs: Unknown (10/23/2024)    PRAPARE - Transportation     Lack of Transportation (Non-Medical): No   Physical Activity: Insufficiently Active (12/2/2024)    Exercise Vital Sign     Days of Exercise per Week: 3 days     Minutes of Exercise per Session: 30 min   Housing Stability: Unknown (10/23/2024)    Housing Stability Vital Sign     Homeless in the Last Year: No       FAMILY HISTORY    History reviewed. No pertinent family history.      VITALS   /80   Pulse 66   Temp 98.3 °F (36.8 °C) (Temporal)   Resp 15   Ht 1.651 m (5' 5\")   Wt 50.8 kg (112 lb)   BMI 18.64 kg/m²     PAIN ASSESSMENT      3/5/2025     9:02 AM   AMB PAIN ASSESSMENT   Location of Pain Neck   Severity of Pain 6   Quality of Pain Sharp;Aching   Duration of Pain Persistent   Frequency of Pain Constant   Aggravating Factors Other (Comment);Exercise   Limiting Behavior Some   Relieving Factors Other (Comment);Exercise   Result of Injury No   Work-Related Injury No          RADIOGRAPHS/DATA  2V AP/LAT Thoracic XR images taken on 12/03/24 personally reviewed with patient:  Mild curvature to the right.  Disc spaces intact. No significant listhesis. No obvious compression deformities. No facet sclerosis.      2V AP/LAT Cervical XR images taken on 12/03/24 personally reviewed with patient:  Occipital bone spur. Straightening of cervical lordosis. Normal alignment. Disc spaces intact. No significant listhesis. No obvious compression deformities. No facet sclerosis.       10 minutes of face-to-face contact were spent with the patient during today's visit extensively discussing symptoms and treatment plan.  All questions were answered. More than half of this visit today was spent on counseling.      By signing my name below, I, Homer Degroot,

## 2025-03-05 ENCOUNTER — OFFICE VISIT (OUTPATIENT)
Age: 30
End: 2025-03-05
Payer: COMMERCIAL

## 2025-03-05 VITALS
HEIGHT: 65 IN | WEIGHT: 112 LBS | DIASTOLIC BLOOD PRESSURE: 80 MMHG | RESPIRATION RATE: 15 BRPM | TEMPERATURE: 98.3 F | BODY MASS INDEX: 18.66 KG/M2 | SYSTOLIC BLOOD PRESSURE: 117 MMHG | HEART RATE: 66 BPM

## 2025-03-05 DIAGNOSIS — M54.9 UPPER BACK PAIN: ICD-10-CM

## 2025-03-05 DIAGNOSIS — M79.18 CHRONIC PRIMARY MUSCULOSKELETAL PAIN: Primary | ICD-10-CM

## 2025-03-05 DIAGNOSIS — M54.2 CERVICAL PAIN: ICD-10-CM

## 2025-03-05 DIAGNOSIS — G89.29 CHRONIC PRIMARY MUSCULOSKELETAL PAIN: Primary | ICD-10-CM

## 2025-03-05 PROCEDURE — 99214 OFFICE O/P EST MOD 30 MIN: CPT | Performed by: PHYSICAL MEDICINE & REHABILITATION

## 2025-03-05 RX ORDER — LEVOCETIRIZINE DIHYDROCHLORIDE 5 MG/1
TABLET, FILM COATED ORAL
COMMUNITY
Start: 2025-01-23

## 2025-03-05 RX ORDER — ALBUTEROL SULFATE 90 UG/1
INHALANT RESPIRATORY (INHALATION)
COMMUNITY
Start: 2024-12-09

## 2025-03-05 RX ORDER — FLUTICASONE PROPIONATE 50 MCG
SPRAY, SUSPENSION (ML) NASAL
COMMUNITY
Start: 2024-12-09

## 2025-03-05 ASSESSMENT — ENCOUNTER SYMPTOMS
VOMITING: 0
TROUBLE SWALLOWING: 0
SHORTNESS OF BREATH: 0
BACK PAIN: 1
NAUSEA: 0
DIARRHEA: 0

## 2025-03-05 NOTE — PATIENT INSTRUCTIONS
MRI Scheduling Number: [id number]  Locations:  [facility]

## 2025-03-11 ENCOUNTER — OFFICE VISIT (OUTPATIENT)
Facility: CLINIC | Age: 30
End: 2025-03-11
Payer: COMMERCIAL

## 2025-03-11 VITALS
DIASTOLIC BLOOD PRESSURE: 70 MMHG | TEMPERATURE: 98.2 F | HEIGHT: 65 IN | SYSTOLIC BLOOD PRESSURE: 118 MMHG | HEART RATE: 72 BPM | BODY MASS INDEX: 18.83 KG/M2 | WEIGHT: 113 LBS | RESPIRATION RATE: 16 BRPM | OXYGEN SATURATION: 99 %

## 2025-03-11 DIAGNOSIS — R10.30 LOWER ABDOMINAL PAIN: Primary | ICD-10-CM

## 2025-03-11 DIAGNOSIS — Z85.048 HISTORY OF RECTAL CANCER: ICD-10-CM

## 2025-03-11 DIAGNOSIS — K62.89 RECTAL PAIN: ICD-10-CM

## 2025-03-11 DIAGNOSIS — D13.91 FAMILIAL POLYPOSIS: ICD-10-CM

## 2025-03-11 PROCEDURE — 99214 OFFICE O/P EST MOD 30 MIN: CPT | Performed by: FAMILY MEDICINE

## 2025-03-11 SDOH — ECONOMIC STABILITY: FOOD INSECURITY: WITHIN THE PAST 12 MONTHS, YOU WORRIED THAT YOUR FOOD WOULD RUN OUT BEFORE YOU GOT MONEY TO BUY MORE.: NEVER TRUE

## 2025-03-11 SDOH — ECONOMIC STABILITY: FOOD INSECURITY: WITHIN THE PAST 12 MONTHS, THE FOOD YOU BOUGHT JUST DIDN'T LAST AND YOU DIDN'T HAVE MONEY TO GET MORE.: NEVER TRUE

## 2025-03-11 ASSESSMENT — ENCOUNTER SYMPTOMS
COUGH: 0
ABDOMINAL PAIN: 1
SHORTNESS OF BREATH: 0
BLOOD IN STOOL: 0
ABDOMINAL DISTENTION: 0
CONSTIPATION: 0
EYE REDNESS: 0
VOMITING: 0
NAUSEA: 0
WHEEZING: 0
ANAL BLEEDING: 0
RECTAL PAIN: 1
DIARRHEA: 0
CHEST TIGHTNESS: 0

## 2025-03-11 ASSESSMENT — PATIENT HEALTH QUESTIONNAIRE - PHQ9
SUM OF ALL RESPONSES TO PHQ QUESTIONS 1-9: 0
SUM OF ALL RESPONSES TO PHQ QUESTIONS 1-9: 0
1. LITTLE INTEREST OR PLEASURE IN DOING THINGS: NOT AT ALL
SUM OF ALL RESPONSES TO PHQ QUESTIONS 1-9: 0
2. FEELING DOWN, DEPRESSED OR HOPELESS: NOT AT ALL
SUM OF ALL RESPONSES TO PHQ QUESTIONS 1-9: 0

## 2025-03-11 NOTE — PROGRESS NOTES
[facility]  3/11/2025    Willie Melo (:  1995) is a 29 y.o. male, here for evaluation of the following medical concerns:    Chief Complaint   Patient presents with    Bladder Problem    Groin Swelling     For weeks, painful to touch    Rectal Pain        ASSESSMENT/ PLAN  1. Lower abdominal pain  Differential includes polyps vs hernia vs ca vs enteritis. Rule out structural abnormality with CT. Referral placed to GI for evaluation.   - CT ABDOMEN PELVIS W IV CONTRAST; Future  - External Referral To Gastroenterology    2. Rectal pain  Same as when he had polyps removed and colon surgery. Referral placed for eval.   - External Referral To Gastroenterology    3. Familial polyposis  - External Referral To Gastroenterology    4. History of rectal cancer  - External Referral To Gastroenterology       No follow-ups on file.    Future Appointments   Date Time Provider Department Center   3/14/2025  1:45 PM [facility] MRI RM 2 [facility]   2025  9:45 AM Morris Dutton MD [facility]   10/23/2025 11:30 AM Jahaira Ellington MD [facility] BS [facility] DEP         HPI  Acute visit for rectal pain and bulge in left lower abdomen.     For the last 2 months he has been experiencing rectal pain with bowel movements.  Rectum feels tight and feel like it did when he needed colon surgery to remove part of his colon and multiple polyps.  He is worried polyps have returned and potentially cancer.  No blood in stool.  He feels that his weight is stable.  Appetite is okay.    Also notes a discomfort in the right lower quadrant of his abdomen.  He will have a bulge there sometimes the size of a golf ball that will come and go.  No pain with palpation, feels like he can push it back into his stomach.    The mass he feels is not there right now.    ROS  Review of Systems   Constitutional:  Negative for activity change, chills, fatigue and fever.   HENT:  Negative for congestion.    Eyes:  Negative for

## 2025-03-12 ENCOUNTER — TELEPHONE (OUTPATIENT)
Facility: HOSPITAL | Age: 30
End: 2025-03-12

## 2025-03-14 ENCOUNTER — HOSPITAL ENCOUNTER (OUTPATIENT)
Facility: HOSPITAL | Age: 30
Discharge: HOME | End: 2025-03-17
Attending: PHYSICAL MEDICINE & REHABILITATION
Payer: COMMERCIAL

## 2025-03-14 DIAGNOSIS — G89.29 CHRONIC PRIMARY MUSCULOSKELETAL PAIN: ICD-10-CM

## 2025-03-14 DIAGNOSIS — M54.2 CERVICAL PAIN: ICD-10-CM

## 2025-03-14 DIAGNOSIS — M79.18 CHRONIC PRIMARY MUSCULOSKELETAL PAIN: ICD-10-CM

## 2025-03-14 PROCEDURE — 72141 MRI NECK SPINE W/O DYE: CPT

## 2025-04-11 ENCOUNTER — HOSPITAL ENCOUNTER (OUTPATIENT)
Facility: HOSPITAL | Age: 30
Discharge: HOME | End: 2025-04-14
Attending: FAMILY MEDICINE
Payer: COMMERCIAL

## 2025-04-11 DIAGNOSIS — R10.30 LOWER ABDOMINAL PAIN: ICD-10-CM

## 2025-04-11 PROCEDURE — 74177 CT ABD & PELVIS W/CONTRAST: CPT

## 2025-04-11 PROCEDURE — 6360000004 HC RX CONTRAST MEDICATION: Performed by: FAMILY MEDICINE

## 2025-04-11 PROCEDURE — 2500000003 HC RX 250 WO HCPCS: Performed by: FAMILY MEDICINE

## 2025-04-11 RX ORDER — IOPAMIDOL 612 MG/ML
100 INJECTION, SOLUTION INTRAVASCULAR
Status: COMPLETED | OUTPATIENT
Start: 2025-04-11 | End: 2025-04-11

## 2025-04-11 RX ADMIN — IOPAMIDOL 100 ML: 612 INJECTION, SOLUTION INTRAVENOUS at 12:38

## 2025-04-11 RX ADMIN — BARIUM SULFATE 600 ML: 20 SUSPENSION ORAL at 12:37

## 2025-04-15 ENCOUNTER — RESULTS FOLLOW-UP (OUTPATIENT)
Facility: CLINIC | Age: 30
End: 2025-04-15

## 2025-05-02 NOTE — PROGRESS NOTES
[facility]  [address], [address]  [place] [address]  Phone: [phone]  Fax: [phone]      Willie Melo  : 1995  PCP: Jahaira Ellington MD  2025    PROGRESS NOTE    HISTORY OF PRESENT ILLNESS    12/3/24  c/o neck and upper back pain onset 8 years ago.   Pt tries Robaxin 750mg TID PRN and Naproxen BID PRN, rx'd by PCP.   Pt [facility] but felt [facility].   Pt notes pain is often more prominent at night.   Pt notes he has not been diagnosed with PTSD but occasionally has flashbacks.   Pt takes Aleve (back and muscle)   Thoracic XR dated 24 was reviewed. Upon my read,  Mild curvature to the right.  Disc spaces intact. No significant listhesis. No obvious compression deformities. No facet sclerosis.   Cervical XR dated 24 was reviewed. Upon my read,  Occipital bone spur. Straightening of cervical lordosis. Normal alignment. Disc spaces intact. No significant listhesis. No obvious compression deformities. No facet sclerosis.   PLAN: Naproxen 500mg BID PRN; Referral to PT- rachael/ too    3/5/25   Pt attended PT (24-25) with some benefit.   Pt continues with HEP.   Pt takes Naproxen 500mg PRN with some benefit.   PLAN: Cervical MRI      Willie Melo is a 29 y.o. male was seen today for follow up. Pt continues with HEP. Pt notes he does not have much equipment at home and planned to get door frame pull up bar.    Cervical MRI images dated 3/14/25 were reviewed. Per report, Essentially normal appearance of cervical spine and cord. No spinal stenosis.      Pain Score:   5 (5-7/10 pain level)/10    PmHx: hx rectal cancer, asthma  Social Hx: sales customer rep. Has 11 mo son (2024)     reviewed.    REVIEW OF SYSTEMS  Review of Systems   Constitutional:  Negative for fever and unexpected weight change.   HENT:  Negative for trouble swallowing.    Eyes:  Negative for visual

## 2025-05-06 ENCOUNTER — OFFICE VISIT (OUTPATIENT)
Age: 30
End: 2025-05-06
Payer: COMMERCIAL

## 2025-05-06 VITALS
RESPIRATION RATE: 16 BRPM | HEIGHT: 65 IN | WEIGHT: 112.2 LBS | HEART RATE: 62 BPM | SYSTOLIC BLOOD PRESSURE: 111 MMHG | TEMPERATURE: 97.1 F | BODY MASS INDEX: 18.69 KG/M2 | DIASTOLIC BLOOD PRESSURE: 70 MMHG

## 2025-05-06 DIAGNOSIS — M54.2 CERVICAL PAIN: ICD-10-CM

## 2025-05-06 DIAGNOSIS — M79.18 CHRONIC PRIMARY MUSCULOSKELETAL PAIN: Primary | ICD-10-CM

## 2025-05-06 DIAGNOSIS — M54.9 UPPER BACK PAIN: ICD-10-CM

## 2025-05-06 DIAGNOSIS — G89.29 CHRONIC PRIMARY MUSCULOSKELETAL PAIN: Primary | ICD-10-CM

## 2025-05-06 PROCEDURE — 99214 OFFICE O/P EST MOD 30 MIN: CPT | Performed by: PHYSICAL MEDICINE & REHABILITATION

## 2025-05-06 ASSESSMENT — ENCOUNTER SYMPTOMS
NAUSEA: 0
BACK PAIN: 1
VOMITING: 0
TROUBLE SWALLOWING: 0
DIARRHEA: 0
SHORTNESS OF BREATH: 0

## 2025-08-06 ENCOUNTER — OFFICE VISIT (OUTPATIENT)
Age: 30
End: 2025-08-06
Payer: COMMERCIAL

## 2025-08-06 VITALS
TEMPERATURE: 97.2 F | OXYGEN SATURATION: 98 % | DIASTOLIC BLOOD PRESSURE: 76 MMHG | HEART RATE: 60 BPM | WEIGHT: 114.4 LBS | HEIGHT: 66 IN | SYSTOLIC BLOOD PRESSURE: 121 MMHG | BODY MASS INDEX: 18.39 KG/M2

## 2025-08-06 DIAGNOSIS — M54.9 UPPER BACK PAIN: ICD-10-CM

## 2025-08-06 DIAGNOSIS — M54.50 LUMBAR PAIN: ICD-10-CM

## 2025-08-06 DIAGNOSIS — M79.18 CHRONIC PRIMARY MUSCULOSKELETAL PAIN: Primary | ICD-10-CM

## 2025-08-06 DIAGNOSIS — G89.29 CHRONIC PRIMARY MUSCULOSKELETAL PAIN: Primary | ICD-10-CM

## 2025-08-06 DIAGNOSIS — M54.2 CERVICAL PAIN: ICD-10-CM

## 2025-08-06 PROCEDURE — 99213 OFFICE O/P EST LOW 20 MIN: CPT | Performed by: PHYSICAL MEDICINE & REHABILITATION

## 2025-08-06 RX ORDER — CYCLOBENZAPRINE HCL 10 MG
10 TABLET ORAL 3 TIMES DAILY PRN
Qty: 90 TABLET | Refills: 5 | Status: SHIPPED | OUTPATIENT
Start: 2025-08-06

## 2025-08-06 RX ORDER — DICLOFENAC SODIUM 75 MG/1
75 TABLET, DELAYED RELEASE ORAL 2 TIMES DAILY PRN
Qty: 60 TABLET | Refills: 5 | Status: SHIPPED | OUTPATIENT
Start: 2025-08-06

## 2025-08-06 ASSESSMENT — ENCOUNTER SYMPTOMS
TROUBLE SWALLOWING: 0
NAUSEA: 0
SHORTNESS OF BREATH: 0
BACK PAIN: 1
VOMITING: 0
DIARRHEA: 0